# Patient Record
Sex: MALE | Race: BLACK OR AFRICAN AMERICAN | Employment: OTHER | URBAN - METROPOLITAN AREA
[De-identification: names, ages, dates, MRNs, and addresses within clinical notes are randomized per-mention and may not be internally consistent; named-entity substitution may affect disease eponyms.]

---

## 2024-06-22 ENCOUNTER — HOSPITAL ENCOUNTER (OUTPATIENT)
Facility: HOSPITAL | Age: 44
Setting detail: OBSERVATION
Discharge: HOME | End: 2024-06-23
Attending: EMERGENCY MEDICINE | Admitting: STUDENT IN AN ORGANIZED HEALTH CARE EDUCATION/TRAINING PROGRAM
Payer: COMMERCIAL

## 2024-06-22 ENCOUNTER — OFFICE VISIT (OUTPATIENT)
Dept: URGENT CARE | Facility: CLINIC | Age: 44
End: 2024-06-22

## 2024-06-22 ENCOUNTER — APPOINTMENT (OUTPATIENT)
Dept: CT IMAGING | Facility: HOSPITAL | Age: 44
End: 2024-06-22
Payer: COMMERCIAL

## 2024-06-22 VITALS
HEART RATE: 58 BPM | OXYGEN SATURATION: 98 % | RESPIRATION RATE: 20 BRPM | SYSTOLIC BLOOD PRESSURE: 132 MMHG | DIASTOLIC BLOOD PRESSURE: 70 MMHG | TEMPERATURE: 94.3 F

## 2024-06-22 DIAGNOSIS — E11.65 HYPERGLYCEMIA DUE TO DIABETES MELLITUS (HCC): ICD-10-CM

## 2024-06-22 DIAGNOSIS — E87.20 LACTIC ACIDOSIS: ICD-10-CM

## 2024-06-22 DIAGNOSIS — R73.9 HYPERGLYCEMIA: Primary | ICD-10-CM

## 2024-06-22 DIAGNOSIS — E11.65 HYPERGLYCEMIC CRISIS IN DIABETES MELLITUS (HCC): Primary | ICD-10-CM

## 2024-06-22 PROBLEM — R94.31 PROLONGED Q-T INTERVAL ON ECG: Status: ACTIVE | Noted: 2024-06-22

## 2024-06-22 PROBLEM — D72.829 LEUKOCYTOSIS: Status: ACTIVE | Noted: 2024-06-22

## 2024-06-22 PROBLEM — Z78.9 ALCOHOL USE: Status: ACTIVE | Noted: 2024-06-22

## 2024-06-22 PROBLEM — R79.89 ELEVATED LACTIC ACID LEVEL: Status: ACTIVE | Noted: 2024-06-22

## 2024-06-22 PROBLEM — R11.10 INTRACTABLE VOMITING: Status: ACTIVE | Noted: 2024-06-22

## 2024-06-22 LAB
ALBUMIN SERPL BCG-MCNC: 3.2 G/DL (ref 3.5–5)
ALP SERPL-CCNC: 41 U/L (ref 34–104)
ALT SERPL W P-5'-P-CCNC: 19 U/L (ref 7–52)
ANION GAP SERPL CALCULATED.3IONS-SCNC: 13 MMOL/L (ref 4–13)
APTT PPP: 26 SECONDS (ref 23–37)
AST SERPL W P-5'-P-CCNC: 17 U/L (ref 13–39)
ATRIAL RATE: 74 BPM
B-OH-BUTYR SERPL-MCNC: 1.36 MMOL/L (ref 0.02–0.27)
BASE EX.OXY STD BLDV CALC-SCNC: 87.4 % (ref 60–80)
BASE EXCESS BLDV CALC-SCNC: -4.3 MMOL/L
BASOPHILS # BLD AUTO: 0.05 THOUSANDS/ÂΜL (ref 0–0.1)
BASOPHILS NFR BLD AUTO: 0 % (ref 0–1)
BILIRUB SERPL-MCNC: 0.47 MG/DL (ref 0.2–1)
BUN SERPL-MCNC: 23 MG/DL (ref 5–25)
CALCIUM ALBUM COR SERPL-MCNC: 9.8 MG/DL (ref 8.3–10.1)
CALCIUM SERPL-MCNC: 9.2 MG/DL (ref 8.4–10.2)
CARDIAC TROPONIN I PNL SERPL HS: <2 NG/L
CHLORIDE SERPL-SCNC: 97 MMOL/L (ref 96–108)
CO2 SERPL-SCNC: 26 MMOL/L (ref 21–32)
CREAT SERPL-MCNC: 1.02 MG/DL (ref 0.6–1.3)
EOSINOPHIL # BLD AUTO: 0.07 THOUSAND/ÂΜL (ref 0–0.61)
EOSINOPHIL NFR BLD AUTO: 0 % (ref 0–6)
ERYTHROCYTE [DISTWIDTH] IN BLOOD BY AUTOMATED COUNT: 12.3 % (ref 11.6–15.1)
EST. AVERAGE GLUCOSE BLD GHB EST-MCNC: 361 MG/DL
GFR SERPL CREATININE-BSD FRML MDRD: 90 ML/MIN/1.73SQ M
GLUCOSE SERPL-MCNC: 300 MG/DL (ref 65–140)
GLUCOSE SERPL-MCNC: 344 MG/DL (ref 65–140)
GLUCOSE SERPL-MCNC: 365 MG/DL (ref 65–140)
GLUCOSE SERPL-MCNC: 425 MG/DL (ref 65–140)
GLUCOSE SERPL-MCNC: 494 MG/DL (ref 65–140)
GLUCOSE SERPL-MCNC: 517 MG/DL (ref 65–140)
HBA1C MFR BLD: 14.2 %
HCO3 BLDV-SCNC: 23 MMOL/L (ref 24–30)
HCT VFR BLD AUTO: 41.2 % (ref 36.5–49.3)
HGB BLD-MCNC: 13.8 G/DL (ref 12–17)
IMM GRANULOCYTES # BLD AUTO: 0.11 THOUSAND/UL (ref 0–0.2)
IMM GRANULOCYTES NFR BLD AUTO: 1 % (ref 0–2)
INR PPP: 0.88 (ref 0.84–1.19)
LACTATE SERPL-SCNC: 3.8 MMOL/L (ref 0.5–2)
LACTATE SERPL-SCNC: 3.8 MMOL/L (ref 0.5–2)
LACTATE SERPL-SCNC: 4.1 MMOL/L (ref 0.5–2)
LACTATE SERPL-SCNC: 4.2 MMOL/L (ref 0.5–2)
LACTATE SERPL-SCNC: 5 MMOL/L (ref 0.5–2)
LIPASE SERPL-CCNC: 10 U/L (ref 11–82)
LYMPHOCYTES # BLD AUTO: 2.31 THOUSANDS/ÂΜL (ref 0.6–4.47)
LYMPHOCYTES NFR BLD AUTO: 12 % (ref 14–44)
MCH RBC QN AUTO: 29.8 PG (ref 26.8–34.3)
MCHC RBC AUTO-ENTMCNC: 33.5 G/DL (ref 31.4–37.4)
MCV RBC AUTO: 89 FL (ref 82–98)
MONOCYTES # BLD AUTO: 1.6 THOUSAND/ÂΜL (ref 0.17–1.22)
MONOCYTES NFR BLD AUTO: 8 % (ref 4–12)
NEUTROPHILS # BLD AUTO: 15.34 THOUSANDS/ÂΜL (ref 1.85–7.62)
NEUTS SEG NFR BLD AUTO: 79 % (ref 43–75)
NRBC BLD AUTO-RTO: 0 /100 WBCS
O2 CT BLDV-SCNC: 18.8 ML/DL
P AXIS: 44 DEGREES
PCO2 BLDV: 50.4 MM HG (ref 42–50)
PH BLDV: 7.28 [PH] (ref 7.3–7.4)
PLATELET # BLD AUTO: 308 THOUSANDS/UL (ref 149–390)
PMV BLD AUTO: 9.7 FL (ref 8.9–12.7)
PO2 BLDV: 63.6 MM HG (ref 35–45)
POTASSIUM SERPL-SCNC: 3.6 MMOL/L (ref 3.5–5.3)
PR INTERVAL: 140 MS
PROT SERPL-MCNC: 7.4 G/DL (ref 6.4–8.4)
PROTHROMBIN TIME: 12.5 SECONDS (ref 11.6–14.5)
QRS AXIS: 27 DEGREES
QRSD INTERVAL: 92 MS
QT INTERVAL: 452 MS
QTC INTERVAL: 501 MS
RBC # BLD AUTO: 4.63 MILLION/UL (ref 3.88–5.62)
SODIUM SERPL-SCNC: 136 MMOL/L (ref 135–147)
T WAVE AXIS: 19 DEGREES
VENTRICULAR RATE: 74 BPM
WBC # BLD AUTO: 19.48 THOUSAND/UL (ref 4.31–10.16)

## 2024-06-22 PROCEDURE — 82010 KETONE BODYS QUAN: CPT | Performed by: EMERGENCY MEDICINE

## 2024-06-22 PROCEDURE — 82805 BLOOD GASES W/O2 SATURATION: CPT | Performed by: EMERGENCY MEDICINE

## 2024-06-22 PROCEDURE — 82948 REAGENT STRIP/BLOOD GLUCOSE: CPT

## 2024-06-22 PROCEDURE — 99285 EMERGENCY DEPT VISIT HI MDM: CPT | Performed by: EMERGENCY MEDICINE

## 2024-06-22 PROCEDURE — G0383 LEV 4 HOSP TYPE B ED VISIT: HCPCS | Performed by: PHYSICIAN ASSISTANT

## 2024-06-22 PROCEDURE — C9113 INJ PANTOPRAZOLE SODIUM, VIA: HCPCS | Performed by: STUDENT IN AN ORGANIZED HEALTH CARE EDUCATION/TRAINING PROGRAM

## 2024-06-22 PROCEDURE — 85610 PROTHROMBIN TIME: CPT | Performed by: EMERGENCY MEDICINE

## 2024-06-22 PROCEDURE — 36415 COLL VENOUS BLD VENIPUNCTURE: CPT | Performed by: EMERGENCY MEDICINE

## 2024-06-22 PROCEDURE — 80053 COMPREHEN METABOLIC PANEL: CPT | Performed by: EMERGENCY MEDICINE

## 2024-06-22 PROCEDURE — 85730 THROMBOPLASTIN TIME PARTIAL: CPT | Performed by: EMERGENCY MEDICINE

## 2024-06-22 PROCEDURE — 93005 ELECTROCARDIOGRAM TRACING: CPT

## 2024-06-22 PROCEDURE — 96360 HYDRATION IV INFUSION INIT: CPT

## 2024-06-22 PROCEDURE — 84484 ASSAY OF TROPONIN QUANT: CPT | Performed by: EMERGENCY MEDICINE

## 2024-06-22 PROCEDURE — 99223 1ST HOSP IP/OBS HIGH 75: CPT | Performed by: STUDENT IN AN ORGANIZED HEALTH CARE EDUCATION/TRAINING PROGRAM

## 2024-06-22 PROCEDURE — 93010 ELECTROCARDIOGRAM REPORT: CPT | Performed by: INTERNAL MEDICINE

## 2024-06-22 PROCEDURE — 99284 EMERGENCY DEPT VISIT MOD MDM: CPT

## 2024-06-22 PROCEDURE — 83605 ASSAY OF LACTIC ACID: CPT | Performed by: EMERGENCY MEDICINE

## 2024-06-22 PROCEDURE — 83690 ASSAY OF LIPASE: CPT | Performed by: EMERGENCY MEDICINE

## 2024-06-22 PROCEDURE — 85025 COMPLETE CBC W/AUTO DIFF WBC: CPT | Performed by: EMERGENCY MEDICINE

## 2024-06-22 PROCEDURE — 74174 CTA ABD&PLVS W/CONTRAST: CPT

## 2024-06-22 PROCEDURE — 83036 HEMOGLOBIN GLYCOSYLATED A1C: CPT | Performed by: STUDENT IN AN ORGANIZED HEALTH CARE EDUCATION/TRAINING PROGRAM

## 2024-06-22 PROCEDURE — 83605 ASSAY OF LACTIC ACID: CPT | Performed by: STUDENT IN AN ORGANIZED HEALTH CARE EDUCATION/TRAINING PROGRAM

## 2024-06-22 RX ORDER — GLIPIZIDE 10 MG/1
10 TABLET ORAL
COMMUNITY
End: 2024-06-23

## 2024-06-22 RX ORDER — INSULIN GLARGINE 100 [IU]/ML
10 INJECTION, SOLUTION SUBCUTANEOUS
Status: DISCONTINUED | OUTPATIENT
Start: 2024-06-22 | End: 2024-06-23

## 2024-06-22 RX ORDER — ONDANSETRON 2 MG/ML
1 INJECTION INTRAMUSCULAR; INTRAVENOUS ONCE
Status: COMPLETED | OUTPATIENT
Start: 2024-06-22 | End: 2024-06-22

## 2024-06-22 RX ORDER — INSULIN LISPRO 100 [IU]/ML
5 INJECTION, SOLUTION INTRAVENOUS; SUBCUTANEOUS ONCE
Status: COMPLETED | OUTPATIENT
Start: 2024-06-22 | End: 2024-06-22

## 2024-06-22 RX ORDER — ONDANSETRON 2 MG/ML
4 INJECTION INTRAMUSCULAR; INTRAVENOUS EVERY 6 HOURS PRN
Status: DISCONTINUED | OUTPATIENT
Start: 2024-06-22 | End: 2024-06-23 | Stop reason: HOSPADM

## 2024-06-22 RX ORDER — ONDANSETRON 2 MG/ML
4 INJECTION INTRAMUSCULAR; INTRAVENOUS ONCE
Status: COMPLETED | OUTPATIENT
Start: 2024-06-22 | End: 2024-06-22

## 2024-06-22 RX ORDER — SODIUM CHLORIDE 9 MG/ML
125 INJECTION, SOLUTION INTRAVENOUS CONTINUOUS
Status: DISCONTINUED | OUTPATIENT
Start: 2024-06-22 | End: 2024-06-23 | Stop reason: HOSPADM

## 2024-06-22 RX ORDER — INSULIN LISPRO 100 [IU]/ML
1-6 INJECTION, SOLUTION INTRAVENOUS; SUBCUTANEOUS
Status: DISCONTINUED | OUTPATIENT
Start: 2024-06-22 | End: 2024-06-23 | Stop reason: HOSPADM

## 2024-06-22 RX ORDER — PANTOPRAZOLE SODIUM 40 MG/10ML
40 INJECTION, POWDER, LYOPHILIZED, FOR SOLUTION INTRAVENOUS EVERY 12 HOURS SCHEDULED
Status: DISCONTINUED | OUTPATIENT
Start: 2024-06-22 | End: 2024-06-23 | Stop reason: HOSPADM

## 2024-06-22 RX ADMIN — PANTOPRAZOLE SODIUM 40 MG: 40 INJECTION, POWDER, FOR SOLUTION INTRAVENOUS at 20:16

## 2024-06-22 RX ADMIN — ONDANSETRON 4 MG: 2 INJECTION INTRAMUSCULAR; INTRAVENOUS at 21:05

## 2024-06-22 RX ADMIN — INSULIN GLARGINE 10 UNITS: 100 INJECTION, SOLUTION SUBCUTANEOUS at 21:05

## 2024-06-22 RX ADMIN — IOHEXOL 100 ML: 350 INJECTION, SOLUTION INTRAVENOUS at 21:53

## 2024-06-22 RX ADMIN — SODIUM CHLORIDE 125 ML/HR: 0.9 INJECTION, SOLUTION INTRAVENOUS at 17:27

## 2024-06-22 RX ADMIN — INSULIN LISPRO 5 UNITS: 100 INJECTION, SOLUTION INTRAVENOUS; SUBCUTANEOUS at 17:32

## 2024-06-22 RX ADMIN — SODIUM CHLORIDE 1000 ML: 0.9 INJECTION, SOLUTION INTRAVENOUS at 15:00

## 2024-06-22 RX ADMIN — SODIUM CHLORIDE, SODIUM LACTATE, POTASSIUM CHLORIDE, AND CALCIUM CHLORIDE 1000 ML: .6; .31; .03; .02 INJECTION, SOLUTION INTRAVENOUS at 20:17

## 2024-06-22 RX ADMIN — SODIUM CHLORIDE 1000 ML: 0.9 INJECTION, SOLUTION INTRAVENOUS at 16:04

## 2024-06-22 RX ADMIN — TRIMETHOBENZAMIDE HYDROCHLORIDE 200 MG: 100 INJECTION INTRAMUSCULAR at 18:34

## 2024-06-22 RX ADMIN — INSULIN LISPRO 6 UNITS: 100 INJECTION, SOLUTION INTRAVENOUS; SUBCUTANEOUS at 17:32

## 2024-06-22 NOTE — PROGRESS NOTES
[facility]        NAME: Son Pandya is a 42 y.o. male  : 1981    MRN: [id number]  DATE: 2024  TIME: 1:55 PM    Assessment and Plan   Hyperglycemic crisis in diabetes mellitus (HCC) [E11.65]  1. Hyperglycemic crisis in diabetes mellitus (HCC)  Transfer to other facility          ***    The patient verbalized understanding of exam findings and treatment plan.   We engaged in the shared decision-making process and treatment options were   discussed at length with the patient.  All questions, concerns and  complaints were answered and addressed to the patient's satisfaction.    Patient Instructions   There are no Patient Instructions on file for this visit.    Follow up with PCP in 3-5 days.  Proceed to  ER if symptoms worsen.    If tests are performed, our office will contact you with results only if   changes need to made to the care plan discussed with you at the visit.   You can review your full results on [facility].     Chief Complaint     Chief Complaint   Patient presents with   • Vomiting     Patient has been having vomiting, diarrhea and weakness for approx 3 hours. Patient denies any bodyaches, chills or abdominal pain. Patient has been feeling lightheaded and dizzy but denies chest pain. Patient has not had anything to eat since last night. Patient's son has been sick for a few days too with similar symptoms.          History of Present Illness       HPI  Pt presents in wheelchair, somnolent, has been having diarrhea x 1, vomiting x 15 times, has been brown/food emesis no blood. Feeling lightheaded, no abdominal pain, chest pain. Hasn't eaten since last night. Pts son has had same symptoms for past several days but the son is not getting worse. Has not taken his glipize or metformin today.     Review of Systems   Review of Systems  All other related systems reviewed and are negative except as noted in HPI    Current Medications       Current Outpatient Medications:   •   "glipiZIDE (GLUCOTROL) 10 mg tablet, Take 10 mg by mouth 2 (two) times a day before meals, Disp: , Rfl:   •  metFORMIN (GLUCOPHAGE) 500 mg tablet, Take 500 mg by mouth 2 (two) times a day with meals, Disp: , Rfl:     Current Allergies     Allergies as of 06/22/2024   • (Not on File)            The following portions of the patient's history were reviewed and updated as appropriate: allergies, current medications, past family history, past medical history, past social history, past surgical history and problem list.     Past Medical History:   Diagnosis Date   • Diabetes (HCC)        History reviewed. No pertinent surgical history.    No family history on file.      Medications have been verified.        Objective   /70   Pulse 58   Temp (!) 94.3 °F (34.6 °C) (Tympanic)   Resp 20   SpO2 98%   No LMP for male patient.       Physical Exam     Physical Exam    Ortho Exam    {Imaging Review Statement:[id number]}    Procedures  {Was Procdoc done:91654::\"No Procedures performed today\"}        Note: Portions of this record may have been created with voice recognition software. Occasional wrong word or \"sound a like\" substitutions may have occurred due to the inherent limitations of voice recognition software. Please read the chart carefully and recognize, using context, where substitutions have occurred.*      "

## 2024-06-22 NOTE — ASSESSMENT & PLAN NOTE
Left voice mail on home and cell, asking for a returned call.    Likely due to viral gastroenteritis. Some component may also be due to hyperglycemia   Supportive care with IV fluids, antiemetics

## 2024-06-22 NOTE — ED PROVIDER NOTES
History  Chief Complaint   Patient presents with   • Hyperglycemia - Symptomatic     Pt has been visiting the area and hasn't had his medications for a day.  Pt usually takes metformin and glipizide.  Went to urgent care and his glucose 517.  Pt has been vomiting and having diarrhea.       Patient is a 42-year-old male past medical history diabetes presenting with hyperglycemia.  Patient states that he began vomiting this morning roughly 15-16 times, nonbilious, nonbloody but chart review reveals that he stated urgent care was brown.  Notes 3 episodes of nonbloody diarrhea.  Denies any abdominal pain, fevers, chest pain, shortness of breath, rashes, vision changes, dysuria but does note lightheadedness.  Received some fluid and Zofran on route.  Was noncompliant with his medications for 1 day as he states he traveled without them.  Does not take insulin.  Blood sugars have been in the 500s.      Prior to Admission Medications   Prescriptions Last Dose Informant Patient Reported? Taking?   glipiZIDE (GLUCOTROL) 10 mg tablet   Yes No   Sig: Take 10 mg by mouth 2 (two) times a day before meals   metFORMIN (GLUCOPHAGE) 500 mg tablet   Yes No   Sig: Take 500 mg by mouth 2 (two) times a day with meals      Facility-Administered Medications: None       Past Medical History:   Diagnosis Date   • Diabetes (HCC)    • Diabetes mellitus (HCC)        No past surgical history on file.    No family history on file.  I have reviewed and agree with the history as documented.    E-Cigarette/Vaping     E-Cigarette/Vaping Substances          Review of Systems   All other systems reviewed and are negative.      Physical Exam  Physical Exam  Vitals reviewed.   Constitutional:       General: He is not in acute distress.     Appearance: Normal appearance. He is not ill-appearing.   HENT:      Mouth/Throat:      Mouth: Mucous membranes are moist.   Eyes:      Conjunctiva/sclera: Conjunctivae normal.   Cardiovascular:      Rate and Rhythm:  Normal rate and regular rhythm.      Pulses: Normal pulses.      Heart sounds: Normal heart sounds.   Pulmonary:      Effort: Pulmonary effort is normal.      Breath sounds: Normal breath sounds.   Abdominal:      General: Abdomen is flat.      Palpations: Abdomen is soft.      Tenderness: There is no abdominal tenderness.   Musculoskeletal:         General: No swelling. Normal range of motion.      Cervical back: Neck supple.      Right lower leg: No edema.      Left lower leg: No edema.   Skin:     General: Skin is warm and dry.   Neurological:      General: No focal deficit present.      Mental Status: He is alert.   Psychiatric:         Mood and Affect: Mood normal.       Vital Signs  ED Triage Vitals [06/22/24 1451]   Temp Pulse Respirations Blood Pressure SpO2   -- 72 16 142/78 98 %      Temp src Heart Rate Source Patient Position - Orthostatic VS BP Location FiO2 (%)   -- Monitor Sitting Right arm --      Pain Score       No Pain           Vitals:    06/22/24 1451   BP: 142/78   Pulse: 72   Patient Position - Orthostatic VS: Sitting         Visual Acuity      ED Medications  Medications   ondansetron (FOR EMS ONLY) (ZOFRAN) 4 mg/2 mL injection 4 mg (has no administration in time range)   sodium chloride 0.9 % bolus 1,000 mL (has no administration in time range)   ondansetron (ZOFRAN) injection 4 mg (has no administration in time range)       Diagnostic Studies  Results Reviewed       None                   No orders to display              Procedures  ECG 12 Lead Documentation Only    Date/Time: 6/22/2024 3:36 PM    Performed by: Macarena Ramirez DO  Authorized by: Macarena Ramirez DO    Patient location:  ED  Previous ECG:     Previous ECG:  Unavailable  Interpretation:     Interpretation: non-specific    Rate:     ECG rate assessment: normal    Rhythm:     Rhythm: sinus rhythm    Ectopy:     Ectopy: none    QRS:     QRS axis:  Normal    QRS intervals:  Normal  Conduction:     Conduction: normal     ST segments:     ST segments:  Normal  Other findings:     Other findings: prolonged qTc interval             ED Course  ED Course as of 06/22/24 1636   Sat Jun 22, 2024   1542 Patient with no anion gap however with beta hydroxybutyrate which is elevated, lactic acid which is elevated, elevated blood sugars, and elevated white count.  Will discuss with ICU and observe.                                             Medical Decision Making  Patient is a 42-year-old male past medical history diabetes presenting with hyperglycemia and vomiting.  Patient is well-appearing at bedside with stable vitals and in no acute distress.  He has no abdominal tenderness, no other significant physical exam findings.  Will obtain labs to assess for signs of DKA, electrolyte abnormalities, anemia, pancreatitis however as he is soft nontender abdomen do not feel that he requires CT at this time.  Will give symptomatic management reevaluate.    Amount and/or Complexity of Data Reviewed  Labs: ordered.    Risk  Prescription drug management.           Disposition  Final diagnoses:   None     ED Disposition       None          Follow-up Information    None         Patient's Medications   Discharge Prescriptions    No medications on file       No discharge procedures on file.    PDMP Review       None            ED Provider  Electronically Signed by             Macarena Ramirez DO  06/22/24 2848

## 2024-06-22 NOTE — ASSESSMENT & PLAN NOTE
Likely reactive in setting of recurrent vomiting   Low suspicion for bacterial infection. Not meeting SIRS/sepsis criteria

## 2024-06-22 NOTE — PROGRESS NOTES
[facility]        NAME: Son Pandya is a 42 y.o. male  : 1981    MRN: [id number]  DATE: 2024  TIME: 2:04 PM    Assessment and Plan   Hyperglycemic crisis in diabetes mellitus (HCC) [E11.65]  1. Hyperglycemic crisis in diabetes mellitus (HCC)  Transfer to other facility          42-year-old male with intractable vomiting, severe hyperglycemia blood glucose 517 today, fortunately vital signs stable however he will require further evaluation and treatment in the emergency department, EMS has been contacted and will bring him to [facility] ED.    The patient verbalized understanding of exam findings and treatment plan.   We engaged in the shared decision-making process and treatment options were   discussed at length with the patient.  All questions, concerns and  complaints were answered and addressed to the patient's satisfaction.    Patient Instructions   There are no Patient Instructions on file for this visit.    Follow up with PCP in 3-5 days.  Proceed to  ER if symptoms worsen.    If tests are performed, our office will contact you with results only if   changes need to made to the care plan discussed with you at the visit.   [facility]     Chief Complaint     Chief Complaint   Patient presents with   • Vomiting     Patient has been having vomiting, diarrhea and weakness for approx 3 hours. Patient denies any bodyaches, chills or abdominal pain. Patient has been feeling lightheaded and dizzy but denies chest pain. Patient has not had anything to eat since last night. Patient's son has been sick for a few days too with similar symptoms.          History of Present Illness       HPI  Pt presents in wheelchair, somnolent, has been having diarrhea x 1, vomiting x 15 times, has been brown/food emesis no blood. Feeling lightheaded, no abdominal pain, chest pain. Hasn't eaten since last night. Pts son has had same symptoms for past several  days but the son is not getting worse. Has not taken his glipize or metformin today.        Review of Systems   Review of Systems  All other related systems reviewed and are negative except as noted in HPI    Current Medications       Current Outpatient Medications:   •  glipiZIDE (GLUCOTROL) 10 mg tablet, Take 10 mg by mouth 2 (two) times a day before meals, Disp: , Rfl:   •  metFORMIN (GLUCOPHAGE) 500 mg tablet, Take 500 mg by mouth 2 (two) times a day with meals, Disp: , Rfl:     Current Allergies     Allergies as of 06/22/2024   • (Not on File)            The following portions of the patient's history were reviewed and updated as appropriate: allergies, current medications, past family history, past medical history, past social history, past surgical history and problem list.     Past Medical History:   Diagnosis Date   • Diabetes (HCC)        History reviewed. No pertinent surgical history.    No family history on file.      Medications have been verified.        Objective   /70   Pulse 58   Temp (!) 94.3 °F (34.6 °C) (Tympanic)   Resp 20   SpO2 98%   No LMP for male patient.       Physical Exam     Physical Exam  Constitutional:       General: He is not in acute distress.     Appearance: He is well-developed.   HENT:      Head: Normocephalic and atraumatic.   Eyes:      General: No scleral icterus.     Extraocular Movements: Extraocular movements intact.      Conjunctiva/sclera: Conjunctivae normal.      Pupils: Pupils are equal, round, and reactive to light.   Cardiovascular:      Rate and Rhythm: Normal rate and regular rhythm.      Heart sounds: Normal heart sounds. No murmur heard.  Pulmonary:      Effort: Pulmonary effort is normal. No respiratory distress.      Breath sounds: No stridor. No wheezing, rhonchi or rales.   Abdominal:      General: Abdomen is flat. There is no distension.      Palpations: Abdomen is soft.      Tenderness: There is no abdominal tenderness.   Musculoskeletal:       "Cervical back: Normal range of motion and neck supple.   Skin:     General: Skin is warm and dry.   Neurological:      General: No focal deficit present.      Mental Status: He is alert and oriented to person, place, and time.   Psychiatric:         Behavior: Behavior normal.         Ortho Exam        Procedures  No Procedures performed today        Note: Portions of this record may have been created with voice recognition software. Occasional wrong word or \"sound a like\" substitutions may have occurred due to the inherent limitations of voice recognition software. Please read the chart carefully and recognize, using context, where substitutions have occurred.*      "

## 2024-06-22 NOTE — ASSESSMENT & PLAN NOTE
"No results found for: \"HGBA1C\"    Recent Labs     06/22/24  1349 06/22/24  1453   POCGLU 517* 425*       Blood Sugar Average: Last 72 hrs:  (P) 425  No prior A1c on file, check here   Likely stress reaction from GI issues   Hold home oral agents. Give 5u lispro now and recheck FSBG. If still markedly elevated, will consider insulin drip   Elevated beta hydroxybutyrate but likely in the setting of vomiting/starvation ketosis. Not meeting criteria for DKA/HHS  Provided good response to short acting insulin, start 10u Lantus and ISS for now until PO intake reliably improves  "

## 2024-06-22 NOTE — ASSESSMENT & PLAN NOTE
Likely adverse effect from metformin in the setting of intractable vomiting   Continue IV hydration, trend until normalized

## 2024-06-22 NOTE — H&P
"[facility]  H&P  Name: Son Pandya 43 y.o. male I MRN: [id number]  Unit/Bed#: -01 I Date of Admission: 6/22/2024   Date of Service: 6/22/2024 I Hospital Day: 0      Assessment & Plan   Prolonged Q-T interval on ECG  Assessment & Plan  Use Tigan as 2nd line for nausea/vomiting    Alcohol use  Assessment & Plan  Endorses 3-4 beers per day, no history of withdrawal  CIWA protocol    Leukocytosis  Assessment & Plan  Likely reactive in setting of recurrent vomiting   Low suspicion for bacterial infection. Not meeting SIRS/sepsis criteria     Intractable vomiting  Assessment & Plan  Likely due to viral gastroenteritis. Some component may also be due to hyperglycemia   Supportive care with IV fluids, antiemetics     Elevated lactic acid level  Assessment & Plan  Likely adverse effect from metformin in the setting of intractable vomiting   Continue IV hydration, trend until normalized    * Hyperglycemia due to diabetes mellitus (HCC)  Assessment & Plan  No results found for: \"HGBA1C\"    Recent Labs     06/22/24  1349 06/22/24  1453   POCGLU 517* 425*       Blood Sugar Average: Last 72 hrs:  (P) 425  No prior A1c on file, check here   Likely stress reaction from GI issues   Hold home oral agents. Give 5u lispro now and recheck FSBG. If still markedly elevated, will consider insulin drip   Elevated beta hydroxybutyrate but likely in the setting of vomiting/starvation ketosis. Not meeting criteria for DKA/HHS  Provided good response to short acting insulin, start 10u Lantus and ISS for now until PO intake reliably improves       VTE Pharmacologic Prophylaxis: VTE Score: 2 Low Risk (Score 0-2) - Encourage Ambulation.  Code Status: Level 1 - Full Code   Discussion with family: Updated  (wife) at bedside.    Anticipated Length of Stay: Patient will be admitted on an observation basis with an anticipated length of stay of less than 2 midnights secondary to hypoglycemia, " vomiting.    Total Time Spent on Date of Encounter in care of patient: 75 mins. This time was spent on one or more of the following: performing physical exam; counseling and coordination of care; obtaining or reviewing history; documenting in the medical record; reviewing/ordering tests, medications or procedures; communicating with other healthcare professionals and discussing with patient's family/caregivers.    Chief Complaint: Vomiting    History of Present Illness:  Son Pandya is a 43 y.o. male with a PMH of type 2 diabetes, non-insulin-dependent who presents with vomiting.  He states that this morning he developed acute nausea and vomiting and estimates that he has vomited 10 times since then.  He describes the color as being slightly dark.  No blood that he seen in there.  No significant abdominal pain or diarrhea so far.  His son has been sick with similar GI symptoms recently.     He takes metformin and glipizide for his diabetes which has been managed by his PCP.  However, he states that he has not seen his PCP in over a year at this point.    Review of Systems:  Review of Systems   Constitutional:  Positive for fatigue. Negative for chills and fever.   HENT:  Negative for ear pain and sore throat.    Eyes:  Negative for pain and visual disturbance.   Respiratory:  Negative for cough and shortness of breath.    Cardiovascular:  Negative for chest pain and palpitations.   Gastrointestinal:  Positive for nausea and vomiting. Negative for abdominal pain.   Genitourinary:  Negative for dysuria and hematuria.   Musculoskeletal:  Negative for arthralgias and back pain.   Skin:  Negative for color change and rash.   Neurological:  Negative for seizures and syncope.   All other systems reviewed and are negative.      Past Medical and Surgical History:   Past Medical History:   Diagnosis Date    Diabetes (HCC)     Diabetes mellitus (HCC)        History reviewed. No pertinent surgical  "history.    Meds/Allergies:  Prior to Admission medications    Medication Sig Start Date End Date Taking? Authorizing Provider   glipiZIDE (GLUCOTROL) 10 mg tablet Take 10 mg by mouth 2 (two) times a day before meals   Yes Historical Provider, MD   metFORMIN (GLUCOPHAGE) 500 mg tablet Take 500 mg by mouth 2 (two) times a day with meals   Yes Historical Provider, MD     I have reviewed home medications with patient personally.    Allergies: No Known Allergies    Social History:  Marital Status: /Civil Union   Occupation: Self-employed  Patient Pre-hospital Living Situation: Home  Patient Pre-hospital Level of Mobility: walks  Patient Pre-hospital Diet Restrictions: None  Substance Use History:   Social History     Substance and Sexual Activity   Alcohol Use None    Comment: 3-4 beers per day     Social History     Tobacco Use   Smoking Status Never   Smokeless Tobacco Never     Social History     Substance and Sexual Activity   Drug Use Never       Family History:  Family History   Problem Relation Age of Onset    No Known Problems Mother     No Known Problems Father        Physical Exam:     Vitals:   Blood Pressure: 133/86 (06/22/24 1657)  Pulse: 79 (06/22/24 1657)  Temperature: (!) 97.4 °F (36.3 °C) (06/22/24 1657)  Temp Source: Oral (06/22/24 1456)  Respirations: 16 (06/22/24 1451)  Height: 5' 9\" (175.3 cm) (06/22/24 1451)  Weight - Scale: 78.9 kg (174 lb) (06/22/24 1451)  SpO2: 99 % (06/22/24 1657)    Physical Exam  Vitals and nursing note reviewed.   Constitutional:       General: He is not in acute distress.     Appearance: He is well-developed.   HENT:      Head: Normocephalic and atraumatic.   Eyes:      Conjunctiva/sclera: Conjunctivae normal.      Pupils: Pupils are equal, round, and reactive to light.   Cardiovascular:      Rate and Rhythm: Normal rate and regular rhythm.      Heart sounds: No murmur heard.  Pulmonary:      Effort: Pulmonary effort is normal. No respiratory distress.      Breath " "sounds: Normal breath sounds. No wheezing or rales.   Abdominal:      General: Abdomen is flat. Bowel sounds are normal. There is no distension.      Palpations: Abdomen is soft.      Tenderness: There is no abdominal tenderness. There is no guarding or rebound.   Musculoskeletal:         General: No swelling. Normal range of motion.      Cervical back: Normal range of motion.   Skin:     General: Skin is warm and dry.      Capillary Refill: Capillary refill takes less than 2 seconds.   Neurological:      General: No focal deficit present.      Mental Status: He is alert. Mental status is at baseline.   Psychiatric:         Mood and Affect: Mood normal.          Additional Data:     Lab Results:  Results from last 7 days   Lab Units 06/22/24  1504   WBC Thousand/uL 19.48*   HEMOGLOBIN g/dL 13.8   HEMATOCRIT % 41.2   PLATELETS Thousands/uL 308   SEGS PCT % 79*   LYMPHO PCT % 12*   MONO PCT % 8   EOS PCT % 0     Results from last 7 days   Lab Units 06/22/24  1458   SODIUM mmol/L 136   POTASSIUM mmol/L 3.6   CHLORIDE mmol/L 97   CO2 mmol/L 26   BUN mg/dL 23   CREATININE mg/dL 1.02   ANION GAP mmol/L 13   CALCIUM mg/dL 9.2   ALBUMIN g/dL 3.2*   TOTAL BILIRUBIN mg/dL 0.47   ALK PHOS U/L 41   ALT U/L 19   AST U/L 17   GLUCOSE RANDOM mg/dL 494*     Results from last 7 days   Lab Units 06/22/24  1458   INR  0.88     Results from last 7 days   Lab Units 06/22/24  1705 06/22/24  1453 06/22/24  1349   POC GLUCOSE mg/dl 365* 425* 517*     No results found for: \"HGBA1C\"  Results from last 7 days   Lab Units 06/22/24  1620 06/22/24  1458   LACTIC ACID mmol/L 3.8* 3.8*       Lines/Drains:  Invasive Devices       Peripheral Intravenous Line  Duration             Peripheral IV 06/22/24 Distal;Left;Upper;Ventral (anterior) Arm <1 day                        Imaging: No pertinent imaging reviewed.  No orders to display       EKG and Other Studies Reviewed on Admission:   EKG: Personally Reviewed. NSR. HR 74. Prolonged QT    ** Please " Note: This note has been constructed using a voice recognition system. **

## 2024-06-22 NOTE — QUICK NOTE
Patient noted by RN to have reddish liquid vomitus. Will start IV pantoprazole 40mg BID. If persistent can consider GI evaluation.

## 2024-06-23 VITALS
HEIGHT: 69 IN | SYSTOLIC BLOOD PRESSURE: 125 MMHG | RESPIRATION RATE: 17 BRPM | WEIGHT: 164.46 LBS | HEART RATE: 89 BPM | OXYGEN SATURATION: 98 % | BODY MASS INDEX: 24.36 KG/M2 | TEMPERATURE: 98.8 F | DIASTOLIC BLOOD PRESSURE: 79 MMHG

## 2024-06-23 PROBLEM — R73.9 HYPERGLYCEMIA: Status: ACTIVE | Noted: 2024-06-23

## 2024-06-23 PROBLEM — R79.89 ELEVATED LACTIC ACID LEVEL: Status: RESOLVED | Noted: 2024-06-22 | Resolved: 2024-06-23

## 2024-06-23 LAB
ANION GAP SERPL CALCULATED.3IONS-SCNC: 7 MMOL/L (ref 4–13)
BUN SERPL-MCNC: 16 MG/DL (ref 5–25)
CALCIUM SERPL-MCNC: 8.4 MG/DL (ref 8.4–10.2)
CHLORIDE SERPL-SCNC: 105 MMOL/L (ref 96–108)
CO2 SERPL-SCNC: 28 MMOL/L (ref 21–32)
CREAT SERPL-MCNC: 0.85 MG/DL (ref 0.6–1.3)
ERYTHROCYTE [DISTWIDTH] IN BLOOD BY AUTOMATED COUNT: 12.6 % (ref 11.6–15.1)
GFR SERPL CREATININE-BSD FRML MDRD: 108 ML/MIN/1.73SQ M
GLUCOSE P FAST SERPL-MCNC: 260 MG/DL (ref 65–99)
GLUCOSE SERPL-MCNC: 201 MG/DL (ref 65–140)
GLUCOSE SERPL-MCNC: 248 MG/DL (ref 65–140)
GLUCOSE SERPL-MCNC: 260 MG/DL (ref 65–140)
HCT VFR BLD AUTO: 36.8 % (ref 36.5–49.3)
HGB BLD-MCNC: 12.2 G/DL (ref 12–17)
LACTATE SERPL-SCNC: 1.2 MMOL/L (ref 0.5–2)
MCH RBC QN AUTO: 29.6 PG (ref 26.8–34.3)
MCHC RBC AUTO-ENTMCNC: 33.2 G/DL (ref 31.4–37.4)
MCV RBC AUTO: 89 FL (ref 82–98)
PLATELET # BLD AUTO: 272 THOUSANDS/UL (ref 149–390)
PMV BLD AUTO: 9.8 FL (ref 8.9–12.7)
POTASSIUM SERPL-SCNC: 3.8 MMOL/L (ref 3.5–5.3)
RBC # BLD AUTO: 4.12 MILLION/UL (ref 3.88–5.62)
SODIUM SERPL-SCNC: 140 MMOL/L (ref 135–147)
WBC # BLD AUTO: 12.07 THOUSAND/UL (ref 4.31–10.16)

## 2024-06-23 PROCEDURE — 99239 HOSP IP/OBS DSCHRG MGMT >30: CPT | Performed by: NURSE PRACTITIONER

## 2024-06-23 PROCEDURE — 82948 REAGENT STRIP/BLOOD GLUCOSE: CPT

## 2024-06-23 PROCEDURE — C9113 INJ PANTOPRAZOLE SODIUM, VIA: HCPCS | Performed by: STUDENT IN AN ORGANIZED HEALTH CARE EDUCATION/TRAINING PROGRAM

## 2024-06-23 PROCEDURE — 83605 ASSAY OF LACTIC ACID: CPT | Performed by: STUDENT IN AN ORGANIZED HEALTH CARE EDUCATION/TRAINING PROGRAM

## 2024-06-23 PROCEDURE — 80048 BASIC METABOLIC PNL TOTAL CA: CPT | Performed by: STUDENT IN AN ORGANIZED HEALTH CARE EDUCATION/TRAINING PROGRAM

## 2024-06-23 PROCEDURE — 85027 COMPLETE CBC AUTOMATED: CPT | Performed by: STUDENT IN AN ORGANIZED HEALTH CARE EDUCATION/TRAINING PROGRAM

## 2024-06-23 RX ORDER — INSULIN GLARGINE 100 [IU]/ML
15 INJECTION, SOLUTION SUBCUTANEOUS
Qty: 10 ML | Refills: 0 | Status: SHIPPED | OUTPATIENT
Start: 2024-06-23

## 2024-06-23 RX ORDER — INSULIN LISPRO 100 [IU]/ML
1-6 INJECTION, SOLUTION INTRAVENOUS; SUBCUTANEOUS
Qty: 3 ML | Refills: 0 | Status: SHIPPED | OUTPATIENT
Start: 2024-06-23

## 2024-06-23 RX ORDER — INSULIN GLARGINE 100 [IU]/ML
15 INJECTION, SOLUTION SUBCUTANEOUS
Status: DISCONTINUED | OUTPATIENT
Start: 2024-06-23 | End: 2024-06-23 | Stop reason: HOSPADM

## 2024-06-23 RX ADMIN — INSULIN LISPRO 2 UNITS: 100 INJECTION, SOLUTION INTRAVENOUS; SUBCUTANEOUS at 12:38

## 2024-06-23 RX ADMIN — INSULIN LISPRO 3 UNITS: 100 INJECTION, SOLUTION INTRAVENOUS; SUBCUTANEOUS at 09:28

## 2024-06-23 RX ADMIN — PANTOPRAZOLE SODIUM 40 MG: 40 INJECTION, POWDER, FOR SOLUTION INTRAVENOUS at 09:28

## 2024-06-23 NOTE — ASSESSMENT & PLAN NOTE
Likely adverse effect from metformin in the setting of intractable vomiting   Continue IV hydration, trend until normalized  resolved

## 2024-06-23 NOTE — QUICK NOTE
Lactic acid continues to rise despite IV fluid boluses. Given abdominal pain, vomiting, and worsening LA will check CT abdomen with contrast

## 2024-06-23 NOTE — ASSESSMENT & PLAN NOTE
Likely due to viral gastroenteritis. Some component may also be due to hyperglycemia   Supportive care with IV fluids, antiemetics   resolved

## 2024-06-23 NOTE — DISCHARGE SUMMARY
[facility]  Discharge- Son Pandya 1980, 43 y.o. male MRN: [id number]  Unit/Bed#: [facility] Encounter: [id number]  Primary Care Provider: No primary care provider on file.   Date and time admitted to hospital: 6/22/2024  2:47 PM    * Hyperglycemia due to diabetes mellitus (HCC)  Assessment & Plan  Lab Results   Component Value Date    HGBA1C 14.2 (H) 06/22/2024       Recent Labs     06/22/24  1705 06/22/24  1842 06/22/24  2051 06/23/24  0716   POCGLU 365* 344* 300* 248*     Blood Sugar Average: Last 72 hrs:  (P) 336.4  No prior A1c on file, check here   Likely stress reaction from GI issues   Will need to dc home oral agents given A1c  Surgars are improving  Elevated beta hydroxybutyrate but likely in the setting of vomiting/starvation ketosis. Not meeting criteria for DKA/HHS  Responded well to the 10 units of Lantus, will increase to 15    Prolonged Q-T interval on ECG  Assessment & Plan  Use Tigan as 2nd line for nausea/vomiting    Alcohol use  Assessment & Plan  Endorses 3-4 beers per day, no history of withdrawal  CIWA protocol    Leukocytosis  Assessment & Plan  Likely reactive in setting of recurrent vomiting   Low suspicion for bacterial infection. Not meeting SIRS/sepsis criteria   Improving without abx    Intractable vomiting  Assessment & Plan  Likely due to viral gastroenteritis. Some component may also be due to hyperglycemia   Supportive care with IV fluids, antiemetics   resolved    Elevated lactic acid level-resolved as of 6/23/2024  Assessment & Plan  Likely adverse effect from metformin in the setting of intractable vomiting   Continue IV hydration, trend until normalized  resolved     Discharging Physician / Practitioner: NORRIS Jaime  PCP: No primary care provider on file.  Admission Date:   Admission Orders (From admission, onward)       Ordered        06/22/24 1616  Place in Observation  Once                          Discharge Date: 06/23/24    Medical  Problems       Resolved Problems  Never Reviewed            Resolved    Elevated lactic acid level 6/23/2024     Resolved by  NORRIS Jaime          Consultations During Hospital Stay:  None    Procedures Performed:   CTA abdomen pelvis w wo contrast    Result Date: 6/23/2024  No acute intra-abdominal abnormality. No free air or free fluid. Unremarkable abdominal aorta and branches. No evidence of arterial vessel stenosis or occlusion. No evidence of large or small bowel obstruction. Workstation performed: [id number]         Significant Findings / Test Results:   Uncontroled DM    Incidental Findings:   none     Test Results Pending at Discharge (will require follow up):   none     Outpatient Tests Requested:  none    Complications:  none    Past Medical History:   Diagnosis Date    Diabetes (HCC)     Diabetes mellitus (HCC)     Elevated lactic acid level 06/22/2024       Reason for Admission: Hyperglycemia - Symptomatic (Pt has been visiting the area and hasn't had his medications for a day.  Pt usually takes metformin and glipizide.  Went to urgent care and his glucose 517.  Pt has been vomiting and having diarrhea.  )       Hospital Course:     Son Pandya is a 43 y.o. male patient with past medical history of above who originally presented to the hospital on 6/22/2024 due to Hyperglycemia - Symptomatic (Pt has been visiting the area and hasn't had his medications for a day.  Pt usually takes metformin and glipizide.  Went to urgent care and his glucose 517.  Pt has been vomiting and having diarrhea.  ) hasn't been taking his medications as prescribed because of side effects, h1ac very high and has a PCP, understands insulin and agreeable A1c was very good before he started messing with his medication    Please see above list of diagnoses and related plan for additional information.     Condition at Discharge: stable     Discharge Day Visit / Exam:     Subjective:  feeling much better, no N/V from  "[place] going abck tomorrow  Vitals: Blood Pressure: 125/79 (06/23/24 0718)  Pulse: 89 (06/23/24 0718)  Temperature: 98.8 °F (37.1 °C) (06/23/24 0719)  Temp Source: Oral (06/23/24 0719)  Respirations: 17 (06/23/24 0718)  Height: 5' 9\" (175.3 cm) (06/22/24 1900)  Weight - Scale: 74.6 kg (164 lb 7.4 oz) (06/22/24 1900)  SpO2: 98 % (06/23/24 0718)  Exam:   Physical Exam  Vitals and nursing note reviewed.   Constitutional:       General: He is not in acute distress.     Appearance: He is well-developed.   HENT:      Head: Normocephalic and atraumatic.   Eyes:      Conjunctiva/sclera: Conjunctivae normal.   Cardiovascular:      Rate and Rhythm: Normal rate and regular rhythm.   Pulmonary:      Effort: Pulmonary effort is normal. No respiratory distress.   Abdominal:      Palpations: Abdomen is soft.      Tenderness: There is no abdominal tenderness.   Musculoskeletal:         General: No swelling.      Cervical back: Neck supple.   Skin:     General: Skin is warm and dry.      Capillary Refill: Capillary refill takes less than 2 seconds.   Neurological:      Mental Status: He is alert.   Psychiatric:         Mood and Affect: Mood normal.       Discussion with Family: at bedside    Discharge instructions/Information to patient and family:   See after visit summary for information provided to patient and family.      Provisions for Follow-Up Care:  See after visit summary for information related to follow-up care and any pertinent home health orders.      Disposition:     Home    Planned Readmission: no     Discharge Statement:  I spent 45 minutes discharging the patient. This time was spent on the day of discharge. I had direct contact with the patient on the day of discharge. Greater than 50% of the total time was spent examining patient, answering all patient questions, arranging and discussing plan of care with patient as well as directly providing post-discharge instructions.  Additional time then spent on discharge " activities.    Discharge Medications:  See after visit summary for reconciled discharge medications provided to patient and family.      ** Please Note: This note has been constructed using a voice recognition system **

## 2024-06-23 NOTE — ASSESSMENT & PLAN NOTE
Lab Results   Component Value Date    HGBA1C 14.2 (H) 06/22/2024       Recent Labs     06/22/24  1705 06/22/24  1842 06/22/24 2051 06/23/24  0716   POCGLU 365* 344* 300* 248*     Blood Sugar Average: Last 72 hrs:  (P) 336.4  No prior A1c on file, check here   Likely stress reaction from GI issues   Will need to dc home oral agents given A1c  Surgars are improving  Elevated beta hydroxybutyrate but likely in the setting of vomiting/starvation ketosis. Not meeting criteria for DKA/HHS  Responded well to the 10 units of Lantus, will increase to 15

## 2024-06-23 NOTE — ASSESSMENT & PLAN NOTE
Likely reactive in setting of recurrent vomiting   Low suspicion for bacterial infection. Not meeting SIRS/sepsis criteria   Improving without abx